# Patient Record
Sex: MALE | Race: WHITE | NOT HISPANIC OR LATINO | Employment: FULL TIME | URBAN - METROPOLITAN AREA
[De-identification: names, ages, dates, MRNs, and addresses within clinical notes are randomized per-mention and may not be internally consistent; named-entity substitution may affect disease eponyms.]

---

## 2018-08-02 ENCOUNTER — OFFICE VISIT (OUTPATIENT)
Dept: FAMILY MEDICINE | Facility: CLINIC | Age: 28
End: 2018-08-02

## 2018-08-02 VITALS
DIASTOLIC BLOOD PRESSURE: 72 MMHG | OXYGEN SATURATION: 98 % | SYSTOLIC BLOOD PRESSURE: 134 MMHG | RESPIRATION RATE: 16 BRPM | TEMPERATURE: 98 F | HEIGHT: 71 IN | HEART RATE: 80 BPM | BODY MASS INDEX: 31.08 KG/M2 | WEIGHT: 222 LBS

## 2018-08-02 DIAGNOSIS — S61.212A LACERATION OF RIGHT MIDDLE FINGER WITHOUT FOREIGN BODY WITHOUT DAMAGE TO NAIL, INITIAL ENCOUNTER: Primary | ICD-10-CM

## 2018-08-02 PROCEDURE — 12001 RPR S/N/AX/GEN/TRNK 2.5CM/<: CPT | Performed by: FAMILY MEDICINE

## 2018-08-02 ASSESSMENT — PAIN SCALES - GENERAL: PAINLEVEL: NO PAIN (0)

## 2018-08-02 NOTE — MR AVS SNAPSHOT
"              After Visit Summary   8/2/2018    Sukh Bauer    MRN: [id number]           Patient Information     Date Of Birth          1990        Visit Information        Provider Department      8/2/2018 9:40 AM Russell Mccullough MD [facility]        Today's Diagnoses     Laceration of right middle finger without foreign body without damage to nail, initial encounter    -  1       Follow-ups after your visit        Who to contact     If you have questions or need follow up information about today's clinic visit or your schedule please contact [facility] directly at [phone].  Normal or non-critical lab and imaging results will be communicated to you by MyChart, letter or phone within 4 business days after the clinic has received the results. If you do not hear from us within 7 days, please contact the clinic through MyChart or phone. If you have a critical or abnormal lab result, we will notify you by phone as soon as possible.  Submit refill requests through [employer] or call your pharmacy and they will forward the refill request to us. Please allow 3 business days for your refill to be completed.          Additional Information About Your Visit        Care EveryWhere ID     This is your Care EveryWhere ID. This could be used by other organizations to access your [place] medical records  [id number]        Your Vitals Were     Pulse Temperature Respirations Height Pulse Oximetry BMI (Body Mass Index)    80 98  F (36.7  C) (Temporal) 16 5' 11\" (1.803 m) 98% 30.96 kg/m2       Blood Pressure from Last 3 Encounters:   08/02/18 134/72   03/29/16 168/88   05/14/12 110/80    Weight from Last 3 Encounters:   08/02/18 222 lb (100.7 kg)   03/29/16 200 lb (90.7 kg)   05/14/12 179 lb 8 oz (81.4 kg)              We Performed the Following     REPAIR SUPERFICIAL, WOUND BODY < =2.5CM        Primary Care Provider Office Phone # Fax #    Kristopher Rubio [phone] "       [address] [facility] [address]        Equal Access to Services     LEIAFERNANDEZ DAMIR : Hadii rick Sarkar, wachauncey cruz, qanuzhat dawsonmasergio cooper, fadia onofregracejamie thompson. So [facility] [phone].    ATENCIÓN: Si habla español, tiene a estrada disposición servicios gratuitos de asistencia lingüística. Llame al [phone].    We comply with applicable federal civil rights laws and [place] laws. We do not discriminate on the basis of race, color, national origin, age, disability, sex, sexual orientation, or gender identity.            Thank you!     Thank you for choosing [facility]  for your care. Our goal is always to provide you with excellent care. Hearing back from our patients is one way we can continue to improve our services. Please take a few minutes to complete the written survey that you may receive in the mail after your visit with us. Thank you!             Your Updated Medication List - Protect others around you: Learn how to safely use, store and throw away your medicines at www.disposemymeds.org.          This list is accurate as of 8/2/18 10:29 AM.  Always use your most recent med list.                   Brand Name Dispense Instructions for use Diagnosis    IBUPROFEN 200 200 MG Tabs      1 TABLET EVERY 4 TO 6 HOURS AS NEEDED

## 2018-08-02 NOTE — NURSING NOTE
Chief Complaint   Patient presents with     Work Comp     left hand, 3rd digit, injury 8/2/18     Health Maintenance Due   Topic Date Due     PHQ-2 Q1 YR  07/27/2002     HIV SCREEN (SYSTEM ASSIGNED)  07/27/2008     TETANUS IMMUNIZATION (SYSTEM ASSIGNED)  08/30/2014     Jacklyn Quintana, [facility]

## 2018-08-02 NOTE — PROGRESS NOTES
SUBJECTIVE:   Sukh Baeur is a 28 year old male who presents to clinic today for the following health issues:    Chief Complaint   Patient presents with     Work Comp     left hand, 3rd digit, injury 8/2/18       New Patient/Transfer of Care        Problem list and histories reviewed & adjusted, as indicated.  Additional history: as documented  Patient is here to today with a laceration to the third digit of his right hand.  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  He is up-to-date on his tetanus.      Patient Active Problem List   Diagnosis     Lumbar back sprain     CARDIOVASCULAR SCREENING; LDL GOAL LESS THAN 160     Finger fracture     Frontal sinus fracture (H)     Past Surgical History:   Procedure Laterality Date     ENDOSCOPIC SINUS SURGERY  5/9/2012    Procedure:ENDOSCOPIC SINUS SURGERY; Stealth Assisted Left Endoscopic Sinus Fracture Repair, Stealth Assisted Left External Approach to Frontal Sinus Repair ; Surgeon:NAKIA CHOWDHURY; Location:[facility]     REPAIR TENDON ACHILLES  2006     SINUS FRONTAL OPEN RECONSTRUCTION  5/9/2012    Procedure:SINUS FRONTAL OPEN RECONSTRUCTION; Stealth Assisted Left External Approach to Frontal Sinus Repair; Surgeon:NAKIA CHOWDHURY; Location[facility]       Social History   Substance Use Topics     Smoking status: Never Smoker     Smokeless tobacco: Never Used     Alcohol use Yes      Comment: rarely     No family history on file.      Current Outpatient Prescriptions   Medication Sig Dispense Refill     IBUPROFEN 200 200 MG OR TABS 1 TABLET EVERY 4 TO 6 HOURS AS NEEDED         Reviewed and updated as needed this visit by clinical staff  Tobacco  Allergies  Meds  Soc Hx      Reviewed and updated as needed this visit by Provider         ROS:  Constitutional, HEENT, cardiovascular, pulmonary, gi and gu systems are negative, except as otherwise  "noted.    OBJECTIVE:     /72 (BP Location: Right arm, Patient Position: Sitting, Cuff Size: Adult Large)  Pulse 80  Temp 98  F (36.7  C) (Temporal)  Resp 16  Ht 5' 11\" (1.803 m)  Wt 222 lb (100.7 kg)  SpO2 98%  BMI 30.96 kg/m2  Body mass index is 30.96 kg/(m^2).   GENERAL: healthy, alert and no distress  Right index finger:  Patient has a full-thickness laceration right over the knuckle measuring about 2 cm.  The wound was anesthetized with 1% lidocaine without epinephrine, prepped with Betadine, reapproximated with 3 4-0 Ethilon interrupted sutures.  Gel dressing was applied        ASSESSMENT:       PLAN:   1. Laceration of right middle finger without foreign body without damage to nail, initial encounter  Now repaired, patient will have the sutures out in 9 days.  States he can take them on himself.  He is up-to-date on his tetanus he will watch for any sign of infection if he has any question concerns regarding healing of this laceration he will contact us and dictation              Russell Mccullough MD, MD  [facility]    "

## 2019-02-26 ENCOUNTER — OFFICE VISIT (OUTPATIENT)
Dept: FAMILY MEDICINE | Facility: CLINIC | Age: 29
End: 2019-02-26
Payer: COMMERCIAL

## 2019-02-26 ENCOUNTER — HOSPITAL ENCOUNTER (OUTPATIENT)
Dept: GENERAL RADIOLOGY | Facility: CLINIC | Age: 29
Discharge: HOME | End: 2019-02-26
Attending: OBSTETRICS & GYNECOLOGY | Admitting: OBSTETRICS & GYNECOLOGY
Payer: COMMERCIAL

## 2019-02-26 VITALS
TEMPERATURE: 96.8 F | WEIGHT: 219.3 LBS | BODY MASS INDEX: 29.06 KG/M2 | RESPIRATION RATE: 22 BRPM | SYSTOLIC BLOOD PRESSURE: 140 MMHG | HEIGHT: 73 IN | DIASTOLIC BLOOD PRESSURE: 68 MMHG | HEART RATE: 88 BPM | OXYGEN SATURATION: 99 %

## 2019-02-26 DIAGNOSIS — M25.521 RIGHT ELBOW PAIN: ICD-10-CM

## 2019-02-26 DIAGNOSIS — M25.521 RIGHT ELBOW PAIN: Primary | ICD-10-CM

## 2019-02-26 PROCEDURE — 99213 OFFICE O/P EST LOW 20 MIN: CPT | Performed by: OBSTETRICS & GYNECOLOGY

## 2019-02-26 PROCEDURE — 73070 X-RAY EXAM OF ELBOW: CPT | Mod: TC

## 2019-02-26 ASSESSMENT — MIFFLIN-ST. JEOR: SCORE: 2011.61

## 2019-02-26 ASSESSMENT — PAIN SCALES - GENERAL: PAINLEVEL: MILD PAIN (2)

## 2019-02-26 NOTE — LETTER
[address] [address]  [phone]        February 26, 2019    Regarding:  Sukh Bauer  [address] [place] [id number]              To Whom It May Concern;  Please excuse Sukh from work for the week of 02/25/2019-03/01/2019 due to an elbow injury.  If you have any questions or concerns please feel free to contact us at the clinic.           Sincerely,        Tyree Drew MD

## 2019-02-26 NOTE — PROGRESS NOTES
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  He does not know exactly what happened but he thinks he hyperextended the right elbow.  It hurts just below the elbow and just above.  There was no swelling.  At this point it hurts to extend his elbow fully.  No numbness or tingling in his hand or wrist.  No forearm pain except immediately distal to the elbow.      The past medical history, social history, past surgical history and family history as shown below have been reviewed by me today.  No past medical history on file.     Allergies   Allergen Reactions     No Known Drug Allergies      Current Outpatient Medications   Medication Sig Dispense Refill     IBUPROFEN 200 200 MG OR TABS 1 TABLET EVERY 4 TO 6 HOURS AS NEEDED       Past Surgical History:   Procedure Laterality Date     ENDOSCOPIC SINUS SURGERY  5/9/2012    Procedure:ENDOSCOPIC SINUS SURGERY; Stealth Assisted Left Endoscopic Sinus Fracture Repair, Stealth Assisted Left External Approach to Frontal Sinus Repair ; Surgeon:NAKIA CHOWDHURY; Location[facility]     REPAIR TENDON ACHILLES  2006     SINUS FRONTAL OPEN RECONSTRUCTION  5/9/2012    Procedure:SINUS FRONTAL OPEN RECONSTRUCTION; Stealth Assisted Left External Approach to Frontal Sinus Repair; Surgeon:NAKIA CHOWDHURY; Location[facility]     Social History     Socioeconomic History     Marital status:      Spouse name: None     Number of children: None     Years of education: None     Highest education level: None   Occupational History     None   Social Needs     Financial resource strain: None     Food insecurity:     Worry: None     Inability: None     Transportation needs:     Medical: None     Non-medical: None   Tobacco Use     Smoking status: Never Smoker     Smokeless tobacco: Never Used   Substance and Sexual Activity     Alcohol use: Yes     Comment: rarely     Drug use: No     Sexual activity: Yes     Partners:  "Female   Lifestyle     Physical activity:     Days per week: None     Minutes per session: None     Stress: None   Relationships     Social connections:     Talks on phone: None     Gets together: None     Attends Spiritism service: None     Active member of club or organization: None     Attends meetings of clubs or organizations: None     Relationship status: None     Intimate partner violence:     Fear of current or ex partner: None     Emotionally abused: None     Physically abused: None     Forced sexual activity: None   Other Topics Concern     None   Social History Narrative     None     No family history on file.    ROS: A 12 point review of systems was done. Except for what is listed above in the HPI, the systems review is negative .      Objective: Vital signs: Blood pressure 140/68, pulse 88, temperature 96.8  F (36  C), temperature source Temporal, resp. rate 22, height 1.843 m (6' 0.56\"), weight 99.5 kg (219 lb 4.8 oz), SpO2 99 %.    Exam of the right elbow reveals some tenderness to palpation along the proximal forearm and distal upper arm but no bruises are noted and no effusion.  He struggles to fully extend the right elbow but he can do so.  He has normal capillary refill in his fingers.  X-ray of the elbow reveals no fractures.  I read the films with Dr. Moore.      Assessment/Plan:      1.  Right elbow pain following wrestling 2 days ago.  I suspect this is just a contusion of the elbow and there is no fracture.  I advised him to take a few days off of work because he does a lot of overhead work and his elbow is hurting.  Note has been written.      2.  His blood pressure is borderline high.  I have advised that he recheck it sometime within the next 3-4 weeks.       LENNY Drew MD              "

## 2020-10-12 ENCOUNTER — NURSE TRIAGE (OUTPATIENT)
Dept: NURSING | Facility: CLINIC | Age: 30
End: 2020-10-12

## 2020-10-12 ENCOUNTER — VIRTUAL VISIT (OUTPATIENT)
Dept: URGENT CARE | Facility: CLINIC | Age: 30
End: 2020-10-12
Payer: COMMERCIAL

## 2020-10-12 DIAGNOSIS — Z20.822 EXPOSURE TO COVID-19 VIRUS: Primary | ICD-10-CM

## 2020-10-12 PROCEDURE — 99213 OFFICE O/P EST LOW 20 MIN: CPT | Mod: TEL | Performed by: NURSE PRACTITIONER

## 2020-10-12 NOTE — TELEPHONE ENCOUNTER
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   NO symptoms.   [facility] did not work for the patient. He was directed to make a telephone appointment with a provider.   Patient needs a test to go back to work.     COVID 19 Nurse Triage Plan/Patient Instructions    Please be aware that novel coronavirus (COVID-19) may be circulating in the community. If you develop symptoms such as fever, cough, or SOB or if you have concerns about the presence of another infection including coronavirus (COVID-19), please contact your health care provider or visit [email or web address].     Disposition/Instructions    Virtual Visit with provider recommended. Reference Visit Selection Guide.    Thank you for taking steps to prevent the spread of this virus.  o Limit your contact with others.  o Wear a simple mask to cover your cough.  o Wash your hands well and often.    Resources    [facility]: About COVID-19: [email or web address]    CDC: What to Do If You're Sick: www.cdc.gov/coronavirus/2019-ncov/about/steps-when-sick.html    CDC: Ending Home Isolation: www.cdc.gov/coronavirus/2019-ncov/hcp/disposition-in-home-patients.html     CDC: Caring for Someone: www.cdc.gov/coronavirus/2019-ncov/if-you-are-sick/care-for-someone.html     [facility]: Interim Guidance for Hospital Discharge to Home: www.health.[facility].[place]/diseases/coronavirus/hcp/hospdischarge.pdf    [facility] clinical trials (COVID-19 research studies): clinicalaffairs.[facility]/umn-clinical-trials     Below are the COVID-19 hotlines at the [place] Department of Health ([facility]). Interpreters are available.   o For health questions: Call 816-408-2382 or 1-266.141.6803 (7 a.m. to 7 p.m.)  o For questions about schools and childcare: Call 149-712-4994 or 1-385.872.1785 (7 a.m. to 7 p.m.)                     COVID 19 Nurse Triage Plan/Patient Instructions    Please be aware that novel coronavirus (COVID-19) may be  "circulating in the community. If you develop symptoms such as fever, cough, or SOB or if you have concerns about the presence of another infection including coronavirus (COVID-19), please contact your health care provider or visit [email or web address].     Disposition/Instructions    Additional COVID19 information to add for patients.   How can I protect others?  If you have symptoms (fever, cough, body aches or trouble breathing): Stay home and away from others (self-isolate) until:    At least 10 days have passed since your symptoms started, And     You ve had no fever--and no medicine that reduces fever--for 1 full day (24 hours), And      Your other symptoms have resolved (gotten better).     If you don t have symptoms, but a test showed that you have COVID-19 (you tested positive):    Stay home and away from others (self-isolate). Follow the tips under \"How do I self-isolate?\" below for 10 days (20 days if you have a weak immune system).    You don't need to be retested for COVID-19 before going back to school or work. As long as you're fever-free and feeling better, you can go back to school, work and other activities after waiting the 10 or 20 days.     How do I self-isolate?    Stay in your own room, even for meals. Use your own bathroom if you can.     Stay away from others in your home. No hugging, kissing or shaking hands. No visitors.    Don t go to work, school or anywhere else.     Clean  high touch  surfaces often (doorknobs, counters, handles, etc.). Use a household cleaning spray or wipes. You ll find a full list on the EPA website:  www.epa.gov/pesticide-registration/list-n-disinfectants-use-against-sars-cov-2.    Cover your mouth and nose with a mask, tissue or washcloth to avoid spreading germs.    Wash your hands and face often. Use soap and water.    Caregivers in these groups are at risk for severe illness due to COVID-19:  o People 65 years and older  o People who live in a nursing home or " long-term care facility  o People with chronic disease (lung, heart, cancer, diabetes, kidney, liver, immunologic)  o People who have a weakened immune system, including those who:  - Are in cancer treatment  - Take medicine that weakens the immune system, such as corticosteroids  - Had a bone marrow or organ transplant  - Have an immune deficiency  - Have poorly controlled HIV or AIDS  - Are obese (body mass index of 40 or higher)  - Smoke regularly    Caregivers should wear gloves while washing dishes, handling laundry and cleaning bedrooms and bathrooms.    Use caution when washing and drying laundry: Don t shake dirty laundry, and use the warmest water setting that you can.    For more tips, go to www.cdc.gov/coronavirus/2019-ncov/downloads/10Things.pdf.    How can I take care of myself?  1. Get lots of rest. Drink extra fluids (unless a doctor has told you not to).     2. Take Tylenol (acetaminophen) for fever or pain. If you have liver or kidney problems, ask your family doctor if it s okay to take Tylenol.     Adults can take either:     650 mg (two 325 mg pills) every 4 to 6 hours, or     1,000 mg (two 500 mg pills) every 8 hours as needed.     Note: Don t take more than 3,000 mg in one day.   Acetaminophen is found in many medicines (both prescribed and over-the-counter medicines). Read all labels to be sure you don t take too much.     For children, check the Tylenol bottle for the right dose. The dose is based on the child s age or weight.    3. If you have other health problems (like cancer, heart failure, an organ transplant or severe kidney disease): Call your specialty clinic if you don t feel better in the next 2 days.    4. Know when to call 911: Emergency warning signs include:    Trouble breathing or shortness of breath    Pain or pressure in the chest that doesn t go away    Feeling confused like you haven t felt before, or not being able to wake up    Bluish-colored lips or face    What are the  symptoms of COVID-19?     The most common symptoms are cough, fever and trouble breathing.     Less common symptoms include body aches, chills, diarrhea (loose, watery poops), fatigue (feeling very tired), headache, runny nose, sore throat and loss of smell.    COVID-19 can cause severe coughing (bronchitis) and lung infection (pneumonia).    How does it spread?     The virus may spread when a person coughs or sneezes into the air. The virus can travel about 6 feet this way, and it can live on surfaces.      Common  (household disinfectants) will kill the virus.    Who is at risk?  Anyone can catch COVID-19 if they re around someone who has the virus.    How can others protect themselves?     Stay away from people who have COVID-19 (or symptoms of COVID-19).    Wash hands often with soap and water. Or, use hand  with at least 60% alcohol.    Avoid touching the eyes, nose or mouth.     Wear a face mask when you go out in public, when sick or when caring for a sick person.    Where can I get more information?    [facility]: About COVID-19: [email or web address]    CDC: What to Do If You re Sick: www.cdc.gov/coronavirus/2019-ncov/about/steps-when-sick.html    CDC: Ending Home Isolation: www.cdc.gov/coronavirus/2019-ncov/hcp/disposition-in-home-patients.html     CDC: Caring for Someone: www.cdc.gov/coronavirus/2019-ncov/if-you-are-sick/care-for-someone.html     [facility]: Interim Guidance for Hospital Discharge to Home: www.health.[facility].mn.us/diseases/coronavirus/hcp/hospdischarge.pdf    [facility] clinical trials (COVID-19 research studies): clinicalaffairs.[facility]/umn-clinical-trials     Below are the COVID-19 hotlines at the [place] Department of Health ([facility]). Interpreters are available.   o For health questions: Call 777-970-9830 or 1-719.735.2614 (7 a.m. to 7 p.m.)  o For questions about schools and childcare: Call 820-611-9985 or 1-450.752.5600 (7 a.m. to 7  p.m.)          Thank you for taking steps to prevent the spread of this virus.  o Limit your contact with others.  o Wear a simple mask to cover your cough.  o Wash your hands well and often.    Resources    [facility]: About COVID-19: [email or web address]    CDC: What to Do If You're Sick: www.cdc.gov/coronavirus/2019-ncov/about/steps-when-sick.html    CDC: Ending Home Isolation: www.cdc.gov/coronavirus/2019-ncov/hcp/disposition-in-home-patients.html     CDC: Caring for Someone: www.cdc.gov/coronavirus/2019-ncov/if-you-are-sick/care-for-someone.html     [facility]: Interim Guidance for Hospital Discharge to Home: www.[facility].mn./diseases/coronavirus/hcp/hospdischarge.pdf    [facility] clinical trials (COVID-19 research studies): clinicalaffairs.[facility]-clinical-trials     Below are the COVID-19 hotlines at the [place] Department of Health ([facility]). Interpreters are available.   o For health questions: Call 311-543-5948 or 1-998.765.8314 (7 a.m. to 7 p.m.)  o For questions about schools and childcare: Call 049-915-8594 or 1-986.685.7455 (7 a.m. to 7 p.m.)                     Reason for Disposition    [1] COVID-19 EXPOSURE (Close Contact) within last 14 days AND [2] needs COVID-19 lab test to return to work AND [3] NO symptoms    Additional Information    Negative: COVID-19 has been diagnosed by a healthcare provider (HCP)    Negative: COVID-19 lab test positive    Negative: [1] Symptoms of COVID-19 (e.g., cough, fever, SOB, or others) AND [2] lives in an area with community spread    Negative: [1] Symptoms of COVID-19 (e.g., cough, fever, SOB, or others) AND [2] within 14 days of EXPOSURE (close contact) with diagnosed or suspected COVID-19 patient    Negative: [1] Symptoms of COVID-19 (e.g., cough, fever, SOB, or others) AND [2] within 14 days of travel from high-risk area for COVID-19 community spread (identified by CDC)    Negative: [1] Difficulty breathing (shortness of breath)  occurs AND [2] onset > 14 days after COVID-19 EXPOSURE (Close Contact) AND [3] no community spread where patient lives    Negative: [1] Dry cough occurs AND [2] onset > 14 days after COVID-19 EXPOSURE AND [3] no community spread where patient lives    Negative: [1] Wet cough (i.e., white-yellow, yellow, green, or erica colored sputum) AND [2] onset > 14 days after COVID-19 EXPOSURE AND [3] no community spread where patient lives    Negative: [1] Common cold symptoms AND [2] onset > 14 days after COVID-19 EXPOSURE AND [3] no community spread where patient lives    Protocols used: CORONAVIRUS (COVID-19) EXPOSURE-A- 8.4.20

## 2020-10-12 NOTE — PROGRESS NOTES
SUBJECTIVE:   Sukh Bauer is a 30 year old male presenting with a chief complaint of covid exposure last Thursday, from student  No symptoms currently.    No past medical history on file.  Current Outpatient Medications   Medication Sig Dispense Refill     IBUPROFEN 200 200 MG OR TABS 1 TABLET EVERY 4 TO 6 HOURS AS NEEDED       Social History     Tobacco Use     Smoking status: Never Smoker     Smokeless tobacco: Never Used   Substance Use Topics     Alcohol use: Yes     Comment: rarely       ROS:  CONSTITUTIONAL:NEGATIVE for fever, chills, change in weight  INTEGUMENTARY/SKIN: NEGATIVE for worrisome rashes, moles or lesions  EYES: NEGATIVE for vision changes or irritation  ENT/MOUTH: NEGATIVE for ear, mouth and throat problems  RESP:NEGATIVE for significant cough or SOB    OBJECTIVE:  GENERAL APPEARANCE: alert and no distress  RESP: lungs clear   CV: regular rates and rhythm  SKIN: no suspicious lesions or rashes    ASSESSMENT:  (Z20.828) Exposure to COVID-19 virus  (primary encounter diagnosis)      PLAN:  Schedule test  Isolate as discussed  Monitor for symptoms    Telephone time spent 11 minutes    ROSELINE Barnett CNP

## 2020-10-13 ENCOUNTER — AMBULATORY - HEALTHEAST (OUTPATIENT)
Dept: FAMILY MEDICINE | Facility: CLINIC | Age: 30
End: 2020-10-13

## 2020-10-13 DIAGNOSIS — Z20.822 EXPOSURE TO COVID-19 VIRUS: ICD-10-CM

## 2020-10-14 ENCOUNTER — AMBULATORY - HEALTHEAST (OUTPATIENT)
Dept: FAMILY MEDICINE | Facility: CLINIC | Age: 30
End: 2020-10-14

## 2020-10-14 DIAGNOSIS — Z20.822 EXPOSURE TO COVID-19 VIRUS: ICD-10-CM

## 2020-10-16 ENCOUNTER — COMMUNICATION - HEALTHEAST (OUTPATIENT)
Dept: SCHEDULING | Facility: CLINIC | Age: 30
End: 2020-10-16

## 2021-01-09 ENCOUNTER — HEALTH MAINTENANCE LETTER (OUTPATIENT)
Age: 31
End: 2021-01-09

## 2021-01-14 ENCOUNTER — HOSPITAL ENCOUNTER (EMERGENCY)
Facility: CLINIC | Age: 31
Discharge: HOME | End: 2021-01-14
Attending: NURSE PRACTITIONER | Admitting: NURSE PRACTITIONER
Payer: COMMERCIAL

## 2021-01-14 ENCOUNTER — TELEPHONE (OUTPATIENT)
Dept: FAMILY MEDICINE | Facility: CLINIC | Age: 31
End: 2021-01-14

## 2021-01-14 VITALS
WEIGHT: 207 LBS | HEART RATE: 83 BPM | BODY MASS INDEX: 27.64 KG/M2 | DIASTOLIC BLOOD PRESSURE: 87 MMHG | SYSTOLIC BLOOD PRESSURE: 146 MMHG | TEMPERATURE: 98.4 F | RESPIRATION RATE: 14 BRPM | OXYGEN SATURATION: 99 %

## 2021-01-14 DIAGNOSIS — S61.213A LACERATION OF LEFT MIDDLE FINGER WITHOUT FOREIGN BODY WITHOUT DAMAGE TO NAIL, INITIAL ENCOUNTER: ICD-10-CM

## 2021-01-14 PROCEDURE — 12001 RPR S/N/AX/GEN/TRNK 2.5CM/<: CPT | Performed by: NURSE PRACTITIONER

## 2021-01-14 PROCEDURE — 99283 EMERGENCY DEPT VISIT LOW MDM: CPT | Performed by: NURSE PRACTITIONER

## 2021-01-14 PROCEDURE — 99282 EMERGENCY DEPT VISIT SF MDM: CPT | Mod: 25 | Performed by: NURSE PRACTITIONER

## 2021-01-14 NOTE — ED TRIAGE NOTES
"Here with laceration to 3rd digit on left hand. States \"I was helping a katlyn in his garage and cut it on a bracket-I just can't get it to stop bleeding\"  "

## 2021-01-14 NOTE — ED AVS SNAPSHOT
[facility] Emergency Dept  [address] [place] [address]  Phone: [phone]  Fax: [phone]                                    Sukh Bauer   MRN: [id number]    Department: [facility] Emergency Dept   Date of Visit: 1/14/2021           After Visit Summary Signature Page    I have received my discharge instructions, and my questions have been answered. I have discussed any challenges I see with this plan with the nurse or doctor.    ..........................................................................................................................................  Patient/Patient Representative Signature      ..........................................................................................................................................  Patient Representative Print Name and Relationship to Patient    ..................................................               ................................................  Date                                   Time    ..........................................................................................................................................  Reviewed by Signature/Title    ...................................................              ..............................................  Date                                               Time          22EPIC Rev 08/18

## 2021-01-14 NOTE — TELEPHONE ENCOUNTER
"S-(situation):  Pt just cut his left middle finger on a sheet of metal at work about 15 m nutes ago. \" The bleeding as pretty much stopped. I have it covers with gauze.\"    B-(background): he said it looks like a pretty long cut.\" Maybe at least an inch or more. \"    A-(assessment): finger laceration.     R-(recommendations): needs to be seen within 2 to 4 hours. NO appt available. Recommend he go to ER to get checked out. He is in agreement with the plan  AUDELIA Martin      "

## 2021-01-14 NOTE — DISCHARGE INSTRUCTIONS
Sutures out in 7-10 days.  Ok to shower, otherwise keep the wound dry covered with bandage until sutures are out.  Use finger splint as needed to protect the finger.

## 2021-10-23 ENCOUNTER — HEALTH MAINTENANCE LETTER (OUTPATIENT)
Age: 31
End: 2021-10-23

## 2021-12-23 ENCOUNTER — NURSE TRIAGE (OUTPATIENT)
Dept: NURSING | Facility: CLINIC | Age: 31
End: 2021-12-23
Payer: COMMERCIAL

## 2021-12-23 NOTE — TELEPHONE ENCOUNTER
Return call to patient. Relayed below message. Patient will call back with any worsening symptoms and keep appointment with optometry.   Ericka Hicks RN   12/23/21 3:57 PM  [facility] Nurse Advisor

## 2021-12-23 NOTE — TELEPHONE ENCOUNTER
Call to [facility] to have SLT request addressed. PCP out of clinic, RN will have covering provider address.   Ericka Hicks RN   12/23/21 2:50 PM  [facility] Nurse Advisor

## 2021-12-23 NOTE — TELEPHONE ENCOUNTER
"Nurse Triage SBAR    Is this a 2nd Level Triage? YES, LICENSED PRACTITIONER REVIEW IS REQUIRED    Situation  :foreign body stuck on eyeball    Background  : patient seen in  Saturday, has antibiotic eye drops that he is taking for a week. Follow up with eye doctor on Tuesday. No Pain or vision changes.     Assessment :Can patient be seen in office, , or okay to wait for appointment on Tuesday to avoid ED?    (See information below for more triage details.)    Recommendation : Routing to provider for recommendation on PCP and Pool.    Protocol Recommended Disposition: Emergency department/UCC or in office with PCP approval.       Patient calling, he was seen in  on Saturday for eye irritation. Patient was given eye drops for redness and swelling. On Monday he noticed debris in the center of his right eye. It is a black spot in the \"color of his eye\". Patient states it is difficult to see, he has to use a flash light to see it. Patient unable to move or get it out. Patient's antibiotic eye drops are due to stop on Saturday and patient has appointment on Tuesday with eye doctor. The redness and swelling has gone down since starting the eye drops.     Protocol recommends ED/UCC or in office with PCP approval.   Routing message to PCP and pool for next steps.   Ericka Hicks RN   12/23/21 12:20 PM  [facility] Nurse Advisor    Reason for Disposition    Foreign body (FB) stuck on eyeball    Additional Information    Negative: Doesn't sound like FB in the eye    Negative: Foreign body is a chemical    Negative: Foreign body (FB) hit eye at high speed (e.g., small metallic chip from hammering, lawnmower, BB gun, explosion)    Protocols used: EYE - FOREIGN BODY-A-OH      "

## 2021-12-23 NOTE — TELEPHONE ENCOUNTER
Per covering provider, if patient isn't having pain or vision concerns he doesn't need to be seen in the ED unless he feels he needs to, vision issues, or pain.  Patient reach out to Optometry to see if they are able to see sooner.      No answer and unable to leave message.     Poonam Corea RN

## 2022-02-12 ENCOUNTER — HEALTH MAINTENANCE LETTER (OUTPATIENT)
Age: 32
End: 2022-02-12

## 2022-10-09 ENCOUNTER — HEALTH MAINTENANCE LETTER (OUTPATIENT)
Age: 32
End: 2022-10-09

## 2023-02-18 ENCOUNTER — HEALTH MAINTENANCE LETTER (OUTPATIENT)
Age: 33
End: 2023-02-18

## 2024-03-06 ENCOUNTER — NURSE TRIAGE (OUTPATIENT)
Dept: FAMILY MEDICINE | Facility: CLINIC | Age: 34
End: 2024-03-06
Payer: COMMERCIAL

## 2024-03-06 NOTE — TELEPHONE ENCOUNTER
Nurse Triage SBAR    Is this a 2nd Level Triage? NO    Situation: Patient calling to report he is having some L front deltoid pain that is going into his armpit and front of chest and is tender to touch. He reports he was doing a lot of strenuous activity over the weekend and is unsure if he may have injured himself by not stretching enough. He does not see any swelling to muscle area at this time. He is experiencing some muscle type spasm pain at times.  No SOB, jaw pain, back pain, nausea, sweating or dizziness.     Background: Has had some high blood pressure in the past, patient is athletic, exercises a lot, feels this is an athletic injury    Assessment: Patient reports some muscle spasm like pain and would like to be checked. He is not currently established here at this time. Discussed red fla symptoms and when to present to ED, he is in agreement with plan.     Protocol Recommended Disposition:   See in Office Within 3 Days    Recommendation: Patient was scheduled an appointment with same day provider tomorrow morning and was also scheduled an establish care appointment for April.         Does the patient meet one of the following criteria for ADS visit consideration? No    ZBIGNIEW Adan, RN      Reason for Disposition   Injury and pain has not improved after 3 days    Additional Information   Negative: Major injury from dangerous force or speed (e.g., MVA, fall > 10 feet or 3 meters)   Negative: Bullet wound, knife wound or other penetrating object   Negative: Puncture wound that sounds life-threatening to the triager   Negative: SEVERE difficulty breathing (e.g., struggling for each breath, speaks in single words)   Negative: Major bleeding (actively dripping or spurting) that can't be stopped   Negative: Open wound of the chest with sound of moving air (sucking wound) or visible air bubbles   Negative: Shock suspected (e.g., cold/pale/clammy skin, too weak to stand, low BP, rapid pulse)   Negative:  Coughing or spitting up blood   Negative: Bluish (or gray) lips or face   Negative: Unconscious or was unconscious   Negative: Sounds like a life-threatening emergency to the triager   Negative: Chest pain not from an injury   Negative: Wound looks infected   Negative: SEVERE chest pain   Negative: Difficulty breathing and not severe   Negative: Skin is split open or gaping   Negative: Bleeding won't stop after 10 minutes of direct pressure (using correct technique)   Negative: Sounds like a serious injury to the triager   Negative: Can't take a deep breath but no respiratory distress (e.g., hurts to take a deep breath)   Negative: Shallow puncture wound   Negative: Collarbone is painful and difficulty raising arm   Negative: Patient is confused or is an unreliable provider of information (e.g., dementia, severe intellectual disability, alcohol intoxication)   Negative: No prior tetanus shots (or is not fully vaccinated) and any wound (e.g., cut or scrape)   Negative: HIV positive or severe immunodeficiency (severely weak immune system) and DIRTY cut or scrape   Negative: Patient wants to be seen   Negative: Last tetanus shot > 5 years ago and DIRTY cut or scrape   Negative: Last tetanus shot > 10 years ago and CLEAN cut or scrape   Negative: MODERATE pain (e.g., interferes with normal activities) and high-risk adult (e.g., age > 60 years, osteoporosis, chronic steroid use)   Negative: Suspicious history for the injury   Negative: Large swelling or bruise and size > palm of person's hand    Protocols used: Chest Injury-A-OH     O-T Plasty Text: The defect edges were debeveled with a #15 scalpel blade. Given the location of the defect, shape of the defect and the proximity to free margins an O-T plasty was deemed most appropriate. Using a sterile surgical marker, an appropriate O-T plasty was drawn incorporating the defect and placing the expected incisions within the relaxed skin tension lines where possible. The area thus outlined was incised deep to adipose tissue with a #15 scalpel blade. The skin margins were undermined to an appropriate distance in all directions utilizing iris scissors. Following this, the designed flap was carried over into the primary defect and sutured into place.

## 2024-03-07 ENCOUNTER — OFFICE VISIT (OUTPATIENT)
Dept: FAMILY MEDICINE | Facility: CLINIC | Age: 34
End: 2024-03-07
Payer: COMMERCIAL

## 2024-03-07 VITALS
DIASTOLIC BLOOD PRESSURE: 86 MMHG | TEMPERATURE: 100.8 F | OXYGEN SATURATION: 97 % | HEIGHT: 72 IN | HEART RATE: 117 BPM | RESPIRATION RATE: 18 BRPM | WEIGHT: 214 LBS | BODY MASS INDEX: 28.99 KG/M2 | SYSTOLIC BLOOD PRESSURE: 128 MMHG

## 2024-03-07 DIAGNOSIS — R07.89 INTERMITTENT LEFT-SIDED CHEST PAIN: Primary | ICD-10-CM

## 2024-03-07 DIAGNOSIS — J06.9 URI WITH COUGH AND CONGESTION: ICD-10-CM

## 2024-03-07 PROCEDURE — 99203 OFFICE O/P NEW LOW 30 MIN: CPT | Mod: 25 | Performed by: NURSE PRACTITIONER

## 2024-03-07 PROCEDURE — 93000 ELECTROCARDIOGRAM COMPLETE: CPT | Performed by: NURSE PRACTITIONER

## 2024-03-07 ASSESSMENT — PAIN SCALES - GENERAL: PAINLEVEL: NO PAIN (0)

## 2024-03-07 NOTE — PROGRESS NOTES
Assessment & Plan     Intermittent left-sided chest pain  Appears to be musculoskeletal in nature which Dennis does agree with. He would like to evaluate for cardiac etiology with EKG which was completed without acute findings. He is slightly tachycardic today, likely related to acute illness and elevated temp. Overall suspicion for cardiac etiology is low given age, history, presentation and reproducible pain. We discussed obtaining chest x-ray or additional lab work today which he declined and opted to just have the EKG performed. Encouraged ice/heat application, stretching and range of motion exercises for the shoulder, arm and chest. Worrisome symptoms for cardiac etiology were reviewed, follow-up emergently if new/worsening symptoms develop.     - EKG 12-lead complete w/read - Clinics    URI with cough and congestion  Suspect viral illness. He declines any viral panel testing today. His temperature is mildly elevated this morning, encouraged to start Tylenol or Ibuprofen for this. Encouraged increased water intake and rest. Use of OTC medications also reviewed.     Supportive cares are encouraged, increased fluid intake and rest.   Guaifenesin (Mucinex) 1,000mg Twice daily as needed for mucus/congestion  Fluticasone (Flonase) 2 puffs in each nostril once daily   Pseudoephedrine (Sudafed)  120mg every 12 hours as needed for congestion   Cetirizine (Zyrtec) 10mg daily   Saline nasal spray or irrigation twice daily (Simply Saline)   Tylenol (1,000mg every 6-8 hours) or Ibuprofen (600mg every 6-8 hours) as needed for fever or pain     Follow up if symptoms fail to improve or worsen, or if shortness of breath or chest pain develop.     I explained my diagnostic considerations and recommendations to the patient, who voiced understanding and agreement with the assessment and treatment plan. All questions were answered to patient's apparent satisfaction. We discussed potential side effects of any prescribed or  "recommended therapies, as well as expectations for response to treatments and importance of lifestyle measures that may improve symptoms. Patient was advised to contact our office if there are new symptoms or no improvement or worsening of conditions or symptoms.    Greater than 30 minutes were spent today with more than 50% dedicated to counseling and coordination of care of the above mentioned problems.            BMI  Estimated body mass index is 29.02 kg/m  as calculated from the following:    Height as of this encounter: 1.829 m (6').    Weight as of this encounter: 97.1 kg (214 lb).             Alexx MAHONEY is a 33 year old, presenting for the following health issues:  Musculoskeletal Problem (Pain in L deltoid  has used ibprofen /Started feeling sick and congested last night )      3/7/2024     8:09 AM   Additional Questions   Roomed by Kelsie     Musculoskeletal Problem    History of Present Illness       Reason for visit:  Concern about pain in chest near heart    He eats 0-1 servings of fruits and vegetables daily.He consumes 3 sweetened beverage(s) daily.He exercises with enough effort to increase his heart rate 60 or more minutes per day.  He exercises with enough effort to increase his heart rate 6 days per week.   He is taking medications regularly.     Dennis presents today with complaints of pain in his left shoulder and chest. His symptoms started on Monday with intermittent pain in his left chest and shoulder. He notes this pain to be around the deltoid insertion site and pectoralis. The pain is intermittent and \"random\" per his report. He can notice it when \"chelly his chest really hard\". Overall he states the pain is very mild and does not interfere with his daily activities. He denies any redness, swelling or bruising. He reports being at a Men's retreat this weekend and jumping to spike a ball while playing volleyball and felt something at that time as well. He does note some shortness " of breath today but feels this is related to being congested and feeling sick last night. He has not noticed any cardiac symptoms, palpitations, light-headedness, dizziness, peripheral edema, orthopnea, pain into his neck or jaw, nausea or vomiting. He is concerned that his symptoms could be cardiac in nature. He reports a family history of hypertension, no other significant cardiac disease in his family.     In addition to this, Dennis reports last night he started feeling ill with cough and cold like symptoms. He has had a fever, body aches, congestion and overall not feeling well. He has not been taking any OTC medications at this point. He reports he has had other family members that have been ill recently.     Patient Active Problem List   Diagnosis    Lumbar back sprain    CARDIOVASCULAR SCREENING; LDL GOAL LESS THAN 160    Finger fracture    Frontal sinus fracture (H)     Current Outpatient Medications   Medication    IBUPROFEN 200 200 MG OR TABS     No current facility-administered medications for this visit.         Review of Systems  Constitutional, HEENT, cardiovascular, pulmonary, gi and gu systems are negative, except as otherwise noted.      Objective    /86 (Cuff Size: Adult Regular)   Pulse 117   Temp (!) 100.8  F (38.2  C) (Temporal)   Resp 18   Ht 1.829 m (6')   Wt 97.1 kg (214 lb)   SpO2 97%   BMI 29.02 kg/m    Body mass index is 29.02 kg/m .  Physical Exam   GENERAL: alert and no distress  EYES: Eyes grossly normal to inspection, PERRL and conjunctivae and sclerae normal  HENT: ear canals and TM's normal, Moderate nasal congestion  NECK: no adenopathy, no asymmetry, masses, or scars  RESP: lungs clear to auscultation - no rales, rhonchi or wheezes  CV: regular rate and rhythm, normal S1 S2, no S3 or S4, no murmur, click or rub, no peripheral edema  MS: no gross musculoskeletal defects noted, no edema. Slight tenderness with palpation over the lateral left pectoralis and deltoid  insertion side. Left shoulder ROM within normal limits, pain reproducible with external rotation of the deltoid.   SKIN: no suspicious lesions or rashes, warm/hot to touch  NEURO: Normal strength and tone, mentation intact and speech normal  PSYCH: mentation appears normal, affect normal/bright    EKG - Reviewed and interpreted by me sinus tachycardia, no LVH by voltage criteria, unchanged from previous tracings        Signed Electronically by: Jennifer Chavira NP

## 2024-03-16 ENCOUNTER — HEALTH MAINTENANCE LETTER (OUTPATIENT)
Age: 34
End: 2024-03-16

## 2024-03-16 ENCOUNTER — NURSE TRIAGE (OUTPATIENT)
Dept: NURSING | Facility: CLINIC | Age: 34
End: 2024-03-16
Payer: COMMERCIAL

## 2024-03-16 NOTE — TELEPHONE ENCOUNTER
"Nurse Triage SBAR    Situation:   -Visit follow-up    Background:   -Patient calling  -It is okay to call back and leave a detailed message at this number:  [phone]     Assessment:   -no current symptoms  -patient seen his [facility] 3[facility]7/[facility]  -he is wondering what the indicatio of the readings it states \"abnormal:    EKG reading:  Sinus Tachycardia -with ectopic ventricular couplets   -RSR(V1) -nondiagnostic.  -Inferolateral ST-elevation -repolarization variant.  ABNORMAL RHYTHM    Recommendation: Home care with clinic follow up  -Plans to follow recommendations  -Call back with and questions, concerns, or any change in symptoms  Care team: please call or send [employer] message on the indicaion of his EKG results and if there are any additional actions needed    KAMALJIT BARBER RN 3/16/2024 11:47 AM    Reason for Disposition   Caller requesting routine or non-urgent lab result    Protocols used: PCP Call - No Triage-A-    "

## 2024-03-18 NOTE — TELEPHONE ENCOUNTER
"Please let Dennis know, his EKG show \"abnormal\" because his heart rate was above 100 bpm. This was likely due to him being ill at the time, also can be impacted by hydration and caffeine intake. The other comments on the reading were likely related to positioning, it can also be difficult to obtain clear readings when patient's have chest hair. Overall myself and another provider looked over the EKG, no concerning findings were found.   "

## 2024-03-19 NOTE — TELEPHONE ENCOUNTER
Called patient and relayed the below message from provider. He had no further questions or concerns.    STEPHANIE CruzN, RN

## 2024-04-26 ENCOUNTER — OFFICE VISIT (OUTPATIENT)
Dept: FAMILY MEDICINE | Facility: CLINIC | Age: 34
End: 2024-04-26
Payer: COMMERCIAL

## 2024-04-26 VITALS
HEIGHT: 72 IN | BODY MASS INDEX: 29.66 KG/M2 | SYSTOLIC BLOOD PRESSURE: 138 MMHG | TEMPERATURE: 97.3 F | HEART RATE: 68 BPM | WEIGHT: 219 LBS | OXYGEN SATURATION: 100 % | DIASTOLIC BLOOD PRESSURE: 79 MMHG

## 2024-04-26 DIAGNOSIS — Z00.00 ROUTINE GENERAL MEDICAL EXAMINATION AT A HEALTH CARE FACILITY: Primary | ICD-10-CM

## 2024-04-26 DIAGNOSIS — I10 BENIGN ESSENTIAL HYPERTENSION: ICD-10-CM

## 2024-04-26 DIAGNOSIS — R74.8 ELEVATED LIVER ENZYMES: ICD-10-CM

## 2024-04-26 DIAGNOSIS — Z13.220 LIPID SCREENING: ICD-10-CM

## 2024-04-26 DIAGNOSIS — N18.2 CKD (CHRONIC KIDNEY DISEASE) STAGE 2, GFR 60-89 ML/MIN: ICD-10-CM

## 2024-04-26 LAB
ALBUMIN SERPL BCG-MCNC: 4.6 G/DL (ref 3.5–5.2)
ALP SERPL-CCNC: 26 U/L (ref 40–150)
ALT SERPL W P-5'-P-CCNC: 488 U/L (ref 0–70)
ANION GAP SERPL CALCULATED.3IONS-SCNC: 9 MMOL/L (ref 7–15)
AST SERPL W P-5'-P-CCNC: 139 U/L (ref 0–45)
BILIRUB SERPL-MCNC: 0.6 MG/DL
BUN SERPL-MCNC: 17.9 MG/DL (ref 6–20)
CALCIUM SERPL-MCNC: 9.4 MG/DL (ref 8.6–10)
CHLORIDE SERPL-SCNC: 101 MMOL/L (ref 98–107)
CHOLEST SERPL-MCNC: 190 MG/DL
CREAT SERPL-MCNC: 1.48 MG/DL (ref 0.67–1.17)
DEPRECATED HCO3 PLAS-SCNC: 29 MMOL/L (ref 22–29)
EGFRCR SERPLBLD CKD-EPI 2021: 64 ML/MIN/1.73M2
ERYTHROCYTE [DISTWIDTH] IN BLOOD BY AUTOMATED COUNT: 13.7 % (ref 10–15)
FASTING STATUS PATIENT QL REPORTED: NO
GLUCOSE SERPL-MCNC: 97 MG/DL (ref 70–99)
HCT VFR BLD AUTO: 44.8 % (ref 40–53)
HDLC SERPL-MCNC: 24 MG/DL
HGB BLD-MCNC: 15.1 G/DL (ref 13.3–17.7)
LDLC SERPL CALC-MCNC: 146 MG/DL
MCH RBC QN AUTO: 27.2 PG (ref 26.5–33)
MCHC RBC AUTO-ENTMCNC: 33.7 G/DL (ref 31.5–36.5)
MCV RBC AUTO: 81 FL (ref 78–100)
NONHDLC SERPL-MCNC: 166 MG/DL
PLATELET # BLD AUTO: 227 10E3/UL (ref 150–450)
POTASSIUM SERPL-SCNC: 4 MMOL/L (ref 3.4–5.3)
PROT SERPL-MCNC: 7.6 G/DL (ref 6.4–8.3)
RBC # BLD AUTO: 5.55 10E6/UL (ref 4.4–5.9)
SODIUM SERPL-SCNC: 139 MMOL/L (ref 135–145)
TRIGL SERPL-MCNC: 102 MG/DL
TSH SERPL DL<=0.005 MIU/L-ACNC: 0.94 UIU/ML (ref 0.3–4.2)
WBC # BLD AUTO: 7.5 10E3/UL (ref 4–11)

## 2024-04-26 PROCEDURE — 99214 OFFICE O/P EST MOD 30 MIN: CPT | Mod: 25 | Performed by: STUDENT IN AN ORGANIZED HEALTH CARE EDUCATION/TRAINING PROGRAM

## 2024-04-26 PROCEDURE — 87340 HEPATITIS B SURFACE AG IA: CPT | Performed by: STUDENT IN AN ORGANIZED HEALTH CARE EDUCATION/TRAINING PROGRAM

## 2024-04-26 PROCEDURE — 84443 ASSAY THYROID STIM HORMONE: CPT | Performed by: STUDENT IN AN ORGANIZED HEALTH CARE EDUCATION/TRAINING PROGRAM

## 2024-04-26 PROCEDURE — 80053 COMPREHEN METABOLIC PANEL: CPT | Performed by: STUDENT IN AN ORGANIZED HEALTH CARE EDUCATION/TRAINING PROGRAM

## 2024-04-26 PROCEDURE — 99395 PREV VISIT EST AGE 18-39: CPT | Performed by: STUDENT IN AN ORGANIZED HEALTH CARE EDUCATION/TRAINING PROGRAM

## 2024-04-26 PROCEDURE — 86803 HEPATITIS C AB TEST: CPT | Performed by: STUDENT IN AN ORGANIZED HEALTH CARE EDUCATION/TRAINING PROGRAM

## 2024-04-26 PROCEDURE — 85027 COMPLETE CBC AUTOMATED: CPT | Performed by: STUDENT IN AN ORGANIZED HEALTH CARE EDUCATION/TRAINING PROGRAM

## 2024-04-26 PROCEDURE — 36415 COLL VENOUS BLD VENIPUNCTURE: CPT | Performed by: STUDENT IN AN ORGANIZED HEALTH CARE EDUCATION/TRAINING PROGRAM

## 2024-04-26 PROCEDURE — 80061 LIPID PANEL: CPT | Performed by: STUDENT IN AN ORGANIZED HEALTH CARE EDUCATION/TRAINING PROGRAM

## 2024-04-26 SDOH — HEALTH STABILITY: PHYSICAL HEALTH: ON AVERAGE, HOW MANY DAYS PER WEEK DO YOU ENGAGE IN MODERATE TO STRENUOUS EXERCISE (LIKE A BRISK WALK)?: 7 DAYS

## 2024-04-26 ASSESSMENT — PAIN SCALES - GENERAL: PAINLEVEL: NO PAIN (0)

## 2024-04-26 ASSESSMENT — SOCIAL DETERMINANTS OF HEALTH (SDOH): HOW OFTEN DO YOU GET TOGETHER WITH FRIENDS OR RELATIVES?: TWICE A WEEK

## 2024-04-26 NOTE — PROGRESS NOTES
Preventive Care Visit  [facility]  Keven Marinelli MD, Family Medicine  Apr 26, 2024      Assessment & Plan   Problem List Items Addressed This Visit    None  Visit Diagnoses       Routine general medical examination at a health care facility    -  Primary    Benign essential hypertension        Relevant Orders    Comprehensive metabolic panel (BMP + Alb, Alk Phos, ALT, AST, Total. Bili, TP) (Completed)    Home Blood Pressure Monitor Order for DME - ONLY FOR DME    TSH with free T4 reflex (Completed)    CBC with platelets (Completed)    Lipid screening        Relevant Orders    Lipid panel reflex to direct LDL Non-fasting (Completed)    Elevated liver enzymes        Relevant Orders    **Comprehensive metabolic panel FUTURE 2mo    Hepatitis B Surface Antibody    Hepatitis B core antibody    Hepatitis B surface antigen    Hepatitis C antibody    F Actin EIA with reflex    Liver kidney microsomal antibody IgG    CKD (chronic kidney disease) stage 2, GFR 60-89 ml/min               Age-appropriate screening and immunization discussed.  Patient with elevated blood pressure but element of whitecoat based on his history.  Did decrease upon repeat today.  Plan for laboratory evaluation with lipid screening.  Will start monitoring home blood pressures and if greater than 130/90 plan to start losartan 25 mg.  Patient otherwise low risk for cardiovascular disease and previous episode earlier this year does sound consistent with musculoskeletal pain as he has excellent exercise tolerance and no continued issues since that episode and did have acute illness at the time.  If recurrent symptoms would want him to be seen and consider stress testing. Cr and liver enzyme elevation on labs today.  Patient with no alcohol history but significant NSAID use as well as multiple supplements with creatine for working out.  Kidney function actually fairly stable from 10 years ago.  Discussed results with  "patient and plan for hepatitis evaluation and further labs today.  Will repeat liver function in 1 month with stopping NSAID and supplements.  If normal evaluation and things remain elevated would obtain ultrasound.    Patient has been advised of split billing requirements and indicates understanding: Yes       BMI  Estimated body mass index is 29.5 kg/m  as calculated from the following:    Height as of this encounter: 1.835 m (6' 0.24\").    Weight as of this encounter: 99.3 kg (219 lb).   Weight management plan: Discussed healthy diet and exercise guidelines    Counseling  Appropriate preventive services were discussed with this patient, including applicable screening as appropriate for fall prevention, nutrition, physical activity, Tobacco-use cessation, weight loss and cognition.  Checklist reviewing preventive services available has been given to the patient.  Reviewed patient's diet, addressing concerns and/or questions.   The patient was instructed to see the dentist every 6 months.   He is at risk for psychosocial distress and has been provided with information to reduce risk.         Alexx MAHONEY is a 33 year old, presenting for the following:  Physical        4/26/2024    12:42 PM   Additional Questions   Roomed by Abbey DUQUE        Health Care Directive  Patient does not have a Health Care Directive or Living Will: Discussed advance care planning with patient; information given to patient to review.    HPI    Follow-up for elevated blood pressures.  Was seen previously for some suspected musculoskeletal chest pain with no recurrent symptoms.  Excellent exertional tolerance.  Does note abnormal workout prior to the symptoms.  No associated respiratory symptoms edema or palpitations.  Feels wonderful now.        4/26/2024   General Health   How would you rate your overall physical health? Good   Feel stress (tense, anxious, or unable to sleep) Only a little   (!) STRESS CONCERN      4/26/2024   Nutrition "   Three or more servings of calcium each day? Yes   Diet: Regular (no restrictions)   How many servings of fruit and vegetables per day? (!) 0-1   How many sweetened beverages each day? 0-1         4/26/2024   Exercise   Days per week of moderate/strenous exercise 7 days         4/26/2024   Social Factors   Frequency of gathering with friends or relatives Twice a week   Worry food won't last until get money to buy more No   Food not last or not have enough money for food? No   Do you have housing?  Yes   Are you worried about losing your housing? No   Lack of transportation? No   Unable to get utilities (heat,electricity)? No         4/26/2024   Dental   Dentist two times every year? (!) NO         4/26/2024   TB Screening   Were you born outside of the [place]? No         Today's PHQ-2 Score:       4/26/2024    12:29 PM   PHQ-2 ( 1999 [employer])   Q1: Little interest or pleasure in doing things 1   Q2: Feeling down, depressed or hopeless 1   PHQ-2 Score 2   Q1: Little interest or pleasure in doing things Several days   Q2: Feeling down, depressed or hopeless Several days   PHQ-2 Score 2           4/26/2024   Substance Use   Alcohol more than 3/day or more than 7/wk No   Do you use any other substances recreationally? No     Social History     Tobacco Use    Smoking status: Never    Smokeless tobacco: Never   Vaping Use    Vaping status: Never Used   Substance Use Topics    Alcohol use: Yes     Comment: rarely    Drug use: No             4/26/2024   One time HIV Screening   Previous HIV test? No         4/26/2024   STI Screening   New sexual partner(s) since last STI/HIV test? No         4/26/2024   Contraception/Family Planning   Questions about contraception or family planning No        Reviewed and updated as needed this visit by Provider                    No past medical history on file.  Past Surgical History:   Procedure Laterality Date    ENDOSCOPIC SINUS SURGERY  5/9/2012    Procedure:ENDOSCOPIC SINUS SURGERY;  "Stealth Assisted Left Endoscopic Sinus Fracture Repair, Stealth Assisted Left External Approach to Frontal Sinus Repair ; Surgeon:ANKIA CHOWDHURY; Location:[facility]    REPAIR TENDON ACHILLES  2006    SINUS FRONTAL OPEN RECONSTRUCTION  5/9/2012    Procedure:SINUS FRONTAL OPEN RECONSTRUCTION; Stealth Assisted Left External Approach to Frontal Sinus Repair; Surgeon:NAKIA CHOWDHURY; Location:[facility]         Review of Systems  Constitutional, HEENT, cardiovascular, pulmonary, GI, , musculoskeletal, neuro, skin, endocrine and psych systems are negative, except as otherwise noted.     Objective    Exam  /79   Pulse 68   Temp 97.3  F (36.3  C) (Temporal)   Ht 1.835 m (6' 0.24\")   Wt 99.3 kg (219 lb)   SpO2 100%   BMI 29.50 kg/m     Estimated body mass index is 29.5 kg/m  as calculated from the following:    Height as of this encounter: 1.835 m (6' 0.24\").    Weight as of this encounter: 99.3 kg (219 lb).    Physical Exam  GENERAL: alert and no distress  EYES: Eyes grossly normal to inspection, PERRL and conjunctivae and sclerae normal  HENT: ear canals and TM's normal, nose and mouth without ulcers or lesions  NECK: no adenopathy, no asymmetry, masses, or scars  RESP: lungs clear to auscultation - no rales, rhonchi or wheezes  CV: regular rate and rhythm, normal S1 S2, no S3 or S4, no murmur, click or rub, no peripheral edema  ABDOMEN: soft, nontender, no hepatosplenomegaly, no masses and bowel sounds normal  MS: no gross musculoskeletal defects noted, no edema  SKIN: no suspicious lesions or rashes  NEURO: Normal strength and tone, mentation intact and speech normal  PSYCH: mentation appears normal, affect normal/bright        Signed Electronically by: Keven Marinelli MD    "

## 2024-04-30 LAB
HBV SURFACE AG SERPL QL IA: NONREACTIVE
HCV AB SERPL QL IA: NONREACTIVE

## 2024-05-02 ENCOUNTER — LAB (OUTPATIENT)
Dept: LAB | Facility: CLINIC | Age: 34
End: 2024-05-02
Payer: COMMERCIAL

## 2024-05-02 DIAGNOSIS — R74.8 ELEVATED LIVER ENZYMES: ICD-10-CM

## 2024-05-02 DIAGNOSIS — Z11.4 SCREENING FOR HIV (HUMAN IMMUNODEFICIENCY VIRUS): Primary | ICD-10-CM

## 2024-05-02 PROCEDURE — 86704 HEP B CORE ANTIBODY TOTAL: CPT

## 2024-05-02 PROCEDURE — 83516 IMMUNOASSAY NONANTIBODY: CPT | Mod: 90

## 2024-05-02 PROCEDURE — 87389 HIV-1 AG W/HIV-1&-2 AB AG IA: CPT

## 2024-05-02 PROCEDURE — 86376 MICROSOMAL ANTIBODY EACH: CPT | Mod: 90

## 2024-05-02 PROCEDURE — 99000 SPECIMEN HANDLING OFFICE-LAB: CPT

## 2024-05-02 PROCEDURE — 86706 HEP B SURFACE ANTIBODY: CPT

## 2024-05-02 PROCEDURE — 36415 COLL VENOUS BLD VENIPUNCTURE: CPT

## 2024-05-03 LAB
HBV CORE AB SERPL QL IA: NONREACTIVE
HBV SURFACE AB SERPL IA-ACNC: 26.8 M[IU]/ML
HBV SURFACE AB SERPL IA-ACNC: REACTIVE M[IU]/ML
HIV 1+2 AB+HIV1 P24 AG SERPL QL IA: NONREACTIVE

## 2024-05-05 LAB
LKM AB TITR SER IF: NORMAL {TITER}
SMA IGG SER-ACNC: 5 UNITS

## 2024-06-04 ENCOUNTER — LAB (OUTPATIENT)
Dept: LAB | Facility: CLINIC | Age: 34
End: 2024-06-04
Payer: COMMERCIAL

## 2024-06-04 DIAGNOSIS — R74.8 ELEVATED LIVER ENZYMES: ICD-10-CM

## 2024-06-04 DIAGNOSIS — R74.8 ELEVATED LIVER ENZYMES: Primary | ICD-10-CM

## 2024-06-04 LAB
ALBUMIN SERPL BCG-MCNC: 4.5 G/DL (ref 3.5–5.2)
ALP SERPL-CCNC: 41 U/L (ref 40–150)
ALT SERPL W P-5'-P-CCNC: 113 U/L (ref 0–70)
ANION GAP SERPL CALCULATED.3IONS-SCNC: 10 MMOL/L (ref 7–15)
AST SERPL W P-5'-P-CCNC: 44 U/L (ref 0–45)
BILIRUB SERPL-MCNC: 0.5 MG/DL
BUN SERPL-MCNC: 21.9 MG/DL (ref 6–20)
CALCIUM SERPL-MCNC: 9.5 MG/DL (ref 8.6–10)
CHLORIDE SERPL-SCNC: 101 MMOL/L (ref 98–107)
CREAT SERPL-MCNC: 1.28 MG/DL (ref 0.67–1.17)
DEPRECATED HCO3 PLAS-SCNC: 28 MMOL/L (ref 22–29)
EGFRCR SERPLBLD CKD-EPI 2021: 76 ML/MIN/1.73M2
GLUCOSE SERPL-MCNC: 82 MG/DL (ref 70–99)
POTASSIUM SERPL-SCNC: 4 MMOL/L (ref 3.4–5.3)
PROT SERPL-MCNC: 7.2 G/DL (ref 6.4–8.3)
SODIUM SERPL-SCNC: 139 MMOL/L (ref 135–145)

## 2024-06-04 PROCEDURE — 80053 COMPREHEN METABOLIC PANEL: CPT

## 2024-06-04 PROCEDURE — 36415 COLL VENOUS BLD VENIPUNCTURE: CPT

## 2024-10-28 NOTE — ED PROVIDER NOTES
History     Chief Complaint   Patient presents with     Laceration     HPI  Sukh Bauer is a 30 year old male who presents to the emergency department for evaluation of laceration to his left middle finger.  This occurred today while he was helping a friend in his garage.  Patient cut his middle finger on a metal bracket.  Tetanus is up-to-date.    Allergies:  Allergies   Allergen Reactions     No Known Drug Allergies        Problem List:    Patient Active Problem List    Diagnosis Date Noted     Frontal sinus fracture (H) 05/14/2012     Priority: Medium     Finger fracture 10/14/2011     Priority: Medium     CARDIOVASCULAR SCREENING; LDL GOAL LESS THAN 160 10/31/2010     Priority: Medium     Lumbar back sprain 08/19/2010     Priority: Medium        Past Medical History:    History reviewed. No pertinent past medical history.    Past Surgical History:    Past Surgical History:   Procedure Laterality Date     ENDOSCOPIC SINUS SURGERY  5/9/2012    Procedure:ENDOSCOPIC SINUS SURGERY; Stealth Assisted Left Endoscopic Sinus Fracture Repair, Stealth Assisted Left External Approach to Frontal Sinus Repair ; Surgeon:NAKIA CHOWDHURY; Location:[facility]     REPAIR TENDON ACHILLES  2006     SINUS FRONTAL OPEN RECONSTRUCTION  5/9/2012    Procedure:SINUS FRONTAL OPEN RECONSTRUCTION; Stealth Assisted Left External Approach to Frontal Sinus Repair; Surgeon:NAKIA CHOWDHURY; Location:[facility]       Family History:    History reviewed. No pertinent family history.    Social History:  Marital Status:   [2]  Social History     Tobacco Use     Smoking status: Never Smoker     Smokeless tobacco: Never Used   Substance Use Topics     Alcohol use: Yes     Comment: rarely     Drug use: No        Medications:         IBUPROFEN 200 200 MG OR TABS          Review of Systems  As mentioned above in the history present illness. All other systems were reviewed and are negative.    Physical Exam   BP: (!) 146/87  Pulse: 83  Temp: 98.4  F (36.9   C)  Resp: 14  Weight: 93.9 kg (207 lb)  SpO2: 99 %      Physical Exam  Appearance: Alert, oriented, no acute distress.  MUSC:  Skin: 2 cm Laceration to the left distal dorsal middle finger-ulnar aspect. clean wound edges, no foreign bodies.   Neuro: Neurovascular and tendon structures are intact.  Left hand warm and pink.      ED Course        [facility]    -Laceration Repair    Date/Time: 1/14/2021 2:39 PM  Performed by: Pretty Marquez APRN CNP  Authorized by: Pretty Marquez APRN CNP       ANESTHESIA (see MAR for exact dosages):     Anesthesia method:  Local infiltration    Local anesthetic:  Lidocaine 1% w/o epi  LACERATION DETAILS     Location:  Finger    Length (cm):  2    REPAIR TYPE:     Repair type:  Simple      EXPLORATION:     Wound exploration: wound explored through full range of motion and entire depth of wound probed and visualized      TREATMENT:     Area cleansed with:  Saline    SKIN REPAIR     Repair method:  Sutures    Suture size:  4-0    Suture technique:  Simple interrupted    Number of sutures:  4    APPROXIMATION     Approximation:  Close    POST-PROCEDURE DETAILS     Dressing:  Antibiotic ointment and adhesive bandage      PROCEDURE   Patient Tolerance:  Patient tolerated the procedure well with no immediate complications                   No results found for this or any previous visit (from the past 24 hour(s)).    Medications - No data to display    Assessments & Plan (with Medical Decision Making)   Laceration as noted above.  Repaired with 4 sutures as noted above.  No evidence of infection, foreign body, or contamination.   Plan as follows:  Sutures out in 7-10 days.  Ok to shower, otherwise keep the wound dry covered with bandage until sutures are out.  Use finger splint as needed to protect the finger.  I have reviewed the nursing notes.    I have reviewed the findings, diagnosis, plan and need for follow up with the  patient.    Discharge Medication List as of 1/14/2021  1:54 PM          Final diagnoses:   Laceration of left middle finger without foreign body without damage to nail, initial encounter       1/14/2021   [facility] EMERGENCY DEPT     Pretty Marquez APRN CNP  01/14/21 1447     skin normal color for race, warm, dry and intact.

## 2025-06-14 ENCOUNTER — HEALTH MAINTENANCE LETTER (OUTPATIENT)
Age: 35
End: 2025-06-14

## 2025-08-08 ENCOUNTER — HOSPITAL ENCOUNTER (EMERGENCY)
Facility: CLINIC | Age: 35
Discharge: HOME | End: 2025-08-08
Attending: EMERGENCY MEDICINE | Admitting: EMERGENCY MEDICINE
Payer: COMMERCIAL

## 2025-08-08 VITALS
HEART RATE: 59 BPM | OXYGEN SATURATION: 99 % | TEMPERATURE: 97.9 F | BODY MASS INDEX: 30.54 KG/M2 | SYSTOLIC BLOOD PRESSURE: 135 MMHG | RESPIRATION RATE: 20 BRPM | HEIGHT: 71 IN | DIASTOLIC BLOOD PRESSURE: 90 MMHG

## 2025-08-08 DIAGNOSIS — G47.30 SLEEP APNEA, UNSPECIFIED TYPE: ICD-10-CM

## 2025-08-08 DIAGNOSIS — R00.2 PALPITATIONS: ICD-10-CM

## 2025-08-08 DIAGNOSIS — R07.9 CHEST PAIN, UNSPECIFIED TYPE: Primary | ICD-10-CM

## 2025-08-08 LAB
ALBUMIN SERPL BCG-MCNC: 4.6 G/DL (ref 3.5–5.2)
ALP SERPL-CCNC: 33 U/L (ref 40–150)
ALT SERPL W P-5'-P-CCNC: 42 U/L (ref 0–70)
ANION GAP SERPL CALCULATED.3IONS-SCNC: 11 MMOL/L (ref 7–15)
AST SERPL W P-5'-P-CCNC: 31 U/L (ref 0–45)
ATRIAL RATE - MUSE: 63 BPM
BASOPHILS # BLD AUTO: 0.1 10E3/UL (ref 0–0.2)
BASOPHILS NFR BLD AUTO: 1 %
BILIRUB SERPL-MCNC: 0.7 MG/DL
BUN SERPL-MCNC: 22.5 MG/DL (ref 6–20)
CALCIUM SERPL-MCNC: 9.5 MG/DL (ref 8.8–10.4)
CHLORIDE SERPL-SCNC: 103 MMOL/L (ref 98–107)
CK SERPL-CCNC: 354 U/L (ref 39–308)
CREAT SERPL-MCNC: 1.27 MG/DL (ref 0.67–1.17)
DIASTOLIC BLOOD PRESSURE - MUSE: NORMAL MMHG
EGFRCR SERPLBLD CKD-EPI 2021: 76 ML/MIN/1.73M2
EOSINOPHIL # BLD AUTO: 0.3 10E3/UL (ref 0–0.7)
EOSINOPHIL NFR BLD AUTO: 4 %
ERYTHROCYTE [DISTWIDTH] IN BLOOD BY AUTOMATED COUNT: 12.7 % (ref 10–15)
GLUCOSE SERPL-MCNC: 95 MG/DL (ref 70–99)
HCO3 SERPL-SCNC: 26 MMOL/L (ref 22–29)
HCT VFR BLD AUTO: 42.7 % (ref 40–53)
HGB BLD-MCNC: 15.1 G/DL (ref 13.3–17.7)
IMM GRANULOCYTES # BLD: 0 10E3/UL
IMM GRANULOCYTES NFR BLD: 0 %
INTERPRETATION ECG - MUSE: NORMAL
LYMPHOCYTES # BLD AUTO: 1.3 10E3/UL (ref 0.8–5.3)
LYMPHOCYTES NFR BLD AUTO: 19 %
MAGNESIUM SERPL-MCNC: 1.9 MG/DL (ref 1.7–2.3)
MCH RBC QN AUTO: 27.8 PG (ref 26.5–33)
MCHC RBC AUTO-ENTMCNC: 35.4 G/DL (ref 31.5–36.5)
MCV RBC AUTO: 79 FL (ref 78–100)
MONOCYTES # BLD AUTO: 0.6 10E3/UL (ref 0–1.3)
MONOCYTES NFR BLD AUTO: 9 %
NEUTROPHILS # BLD AUTO: 4.7 10E3/UL (ref 1.6–8.3)
NEUTROPHILS NFR BLD AUTO: 67 %
NRBC # BLD AUTO: 0 10E3/UL
NRBC BLD AUTO-RTO: 0 /100
P AXIS - MUSE: 45 DEGREES
PLATELET # BLD AUTO: 161 10E3/UL (ref 150–450)
POTASSIUM SERPL-SCNC: 4.3 MMOL/L (ref 3.4–5.3)
PR INTERVAL - MUSE: 140 MS
PROT SERPL-MCNC: 7 G/DL (ref 6.4–8.3)
QRS DURATION - MUSE: 82 MS
QT - MUSE: 372 MS
QTC - MUSE: 380 MS
R AXIS - MUSE: 56 DEGREES
RBC # BLD AUTO: 5.44 10E6/UL (ref 4.4–5.9)
SODIUM SERPL-SCNC: 140 MMOL/L (ref 135–145)
SYSTOLIC BLOOD PRESSURE - MUSE: NORMAL MMHG
T AXIS - MUSE: 39 DEGREES
TROPONIN T SERPL HS-MCNC: <6 NG/L
VENTRICULAR RATE- MUSE: 63 BPM
WBC # BLD AUTO: 6.9 10E3/UL (ref 4–11)

## 2025-08-08 PROCEDURE — 93010 ELECTROCARDIOGRAM REPORT: CPT | Performed by: EMERGENCY MEDICINE

## 2025-08-08 PROCEDURE — 258N000003 HC RX IP 258 OP 636: Performed by: EMERGENCY MEDICINE

## 2025-08-08 PROCEDURE — 99284 EMERGENCY DEPT VISIT MOD MDM: CPT | Performed by: EMERGENCY MEDICINE

## 2025-08-08 PROCEDURE — 84155 ASSAY OF PROTEIN SERUM: CPT | Performed by: EMERGENCY MEDICINE

## 2025-08-08 PROCEDURE — 36415 COLL VENOUS BLD VENIPUNCTURE: CPT | Performed by: EMERGENCY MEDICINE

## 2025-08-08 PROCEDURE — 83735 ASSAY OF MAGNESIUM: CPT | Performed by: EMERGENCY MEDICINE

## 2025-08-08 PROCEDURE — 84484 ASSAY OF TROPONIN QUANT: CPT | Performed by: EMERGENCY MEDICINE

## 2025-08-08 PROCEDURE — 82550 ASSAY OF CK (CPK): CPT | Performed by: EMERGENCY MEDICINE

## 2025-08-08 PROCEDURE — 96360 HYDRATION IV INFUSION INIT: CPT | Performed by: EMERGENCY MEDICINE

## 2025-08-08 PROCEDURE — 93005 ELECTROCARDIOGRAM TRACING: CPT | Performed by: EMERGENCY MEDICINE

## 2025-08-08 PROCEDURE — 85025 COMPLETE CBC W/AUTO DIFF WBC: CPT | Performed by: EMERGENCY MEDICINE

## 2025-08-08 RX ADMIN — SODIUM CHLORIDE 1000 ML: 0.9 INJECTION, SOLUTION INTRAVENOUS at 14:36

## 2025-08-08 ASSESSMENT — ACTIVITIES OF DAILY LIVING (ADL): ADLS_ACUITY_SCORE: 41

## 2025-08-08 ASSESSMENT — COLUMBIA-SUICIDE SEVERITY RATING SCALE - C-SSRS
6. HAVE YOU EVER DONE ANYTHING, STARTED TO DO ANYTHING, OR PREPARED TO DO ANYTHING TO END YOUR LIFE?: NO
1. IN THE PAST MONTH, HAVE YOU WISHED YOU WERE DEAD OR WISHED YOU COULD GO TO SLEEP AND NOT WAKE UP?: NO
2. HAVE YOU ACTUALLY HAD ANY THOUGHTS OF KILLING YOURSELF IN THE PAST MONTH?: NO

## 2025-08-11 ENCOUNTER — PATIENT OUTREACH (OUTPATIENT)
Dept: CARE COORDINATION | Facility: CLINIC | Age: 35
End: 2025-08-11
Payer: COMMERCIAL

## 2025-08-13 ENCOUNTER — PATIENT OUTREACH (OUTPATIENT)
Dept: CARE COORDINATION | Facility: CLINIC | Age: 35
End: 2025-08-13
Payer: COMMERCIAL